# Patient Record
(demographics unavailable — no encounter records)

---

## 2025-04-10 NOTE — HISTORY OF PRESENT ILLNESS
[Y] : Patient uses contraception [LMPDate] : 3/17/2025 [MensesFreq] : 28 [MensesLength] : 5 [FreeTextEntry1] : 3/17/2025 [Yes] : Yes [Condoms] : Condoms